# Patient Record
Sex: MALE | Race: WHITE | NOT HISPANIC OR LATINO
[De-identification: names, ages, dates, MRNs, and addresses within clinical notes are randomized per-mention and may not be internally consistent; named-entity substitution may affect disease eponyms.]

---

## 2018-04-11 ENCOUNTER — APPOINTMENT (OUTPATIENT)
Dept: RADIOLOGY | Facility: CLINIC | Age: 58
End: 2018-04-11
Payer: COMMERCIAL

## 2018-04-11 ENCOUNTER — OUTPATIENT (OUTPATIENT)
Dept: OUTPATIENT SERVICES | Facility: HOSPITAL | Age: 58
LOS: 1 days | End: 2018-04-11
Payer: COMMERCIAL

## 2018-04-11 DIAGNOSIS — Z00.8 ENCOUNTER FOR OTHER GENERAL EXAMINATION: ICD-10-CM

## 2018-04-11 PROCEDURE — 74018 RADEX ABDOMEN 1 VIEW: CPT | Mod: 26

## 2018-04-11 PROCEDURE — 74018 RADEX ABDOMEN 1 VIEW: CPT

## 2019-05-10 ENCOUNTER — APPOINTMENT (OUTPATIENT)
Dept: UROLOGY | Facility: CLINIC | Age: 59
End: 2019-05-10
Payer: COMMERCIAL

## 2019-05-31 ENCOUNTER — APPOINTMENT (OUTPATIENT)
Dept: UROLOGY | Facility: CLINIC | Age: 59
End: 2019-05-31
Payer: COMMERCIAL

## 2019-05-31 VITALS
BODY MASS INDEX: 31.14 KG/M2 | WEIGHT: 235 LBS | HEIGHT: 73 IN | OXYGEN SATURATION: 98 % | DIASTOLIC BLOOD PRESSURE: 80 MMHG | SYSTOLIC BLOOD PRESSURE: 154 MMHG | HEART RATE: 91 BPM

## 2019-05-31 DIAGNOSIS — N20.0 CALCULUS OF KIDNEY: ICD-10-CM

## 2019-05-31 PROCEDURE — 99204 OFFICE O/P NEW MOD 45 MIN: CPT

## 2019-05-31 RX ORDER — METOPROLOL SUCCINATE 50 MG/1
50 TABLET, EXTENDED RELEASE ORAL
Refills: 0 | Status: ACTIVE | COMMUNITY

## 2019-05-31 RX ORDER — FINASTERIDE 5 MG/1
5 TABLET, FILM COATED ORAL
Refills: 0 | Status: ACTIVE | COMMUNITY

## 2019-05-31 RX ORDER — ATORVASTATIN CALCIUM 20 MG/1
20 TABLET, FILM COATED ORAL
Refills: 0 | Status: ACTIVE | COMMUNITY

## 2019-05-31 RX ORDER — LEVOTHYROXINE SODIUM 50 UG/1
50 TABLET ORAL
Refills: 0 | Status: ACTIVE | COMMUNITY

## 2019-05-31 RX ORDER — FESOTERODINE FUMARATE 8 MG/1
8 TABLET, FILM COATED, EXTENDED RELEASE ORAL
Refills: 0 | Status: ACTIVE | COMMUNITY

## 2019-05-31 RX ORDER — DORZOLAMIDE HYDROCHLORIDE TIMOLOL MALEATE 20; 5 MG/ML; MG/ML
22.3-6.8 SOLUTION/ DROPS OPHTHALMIC
Refills: 0 | Status: ACTIVE | COMMUNITY

## 2019-05-31 RX ORDER — BIMATOPROST 0.1 MG/ML
0.01 SOLUTION/ DROPS OPHTHALMIC
Refills: 0 | Status: ACTIVE | COMMUNITY

## 2019-05-31 RX ORDER — FLUTICASONE PROPIONATE 50 MCG
50 SPRAY, SUSPENSION NASAL
Refills: 0 | Status: ACTIVE | COMMUNITY

## 2019-05-31 RX ORDER — FEBUXOSTAT 40 MG/1
40 TABLET ORAL
Refills: 0 | Status: ACTIVE | COMMUNITY

## 2019-05-31 NOTE — REVIEW OF SYSTEMS
[Eyesight Problems] : eyesight problems [Heartburn] : heartburn [History of kidney stones] : history of kidney stones [Wake up at night to urinate  How many times?  ___] : wakes up to urinate [unfilled] times during the night [Strong urge to urinate] : strong urge to urinate [Difficulty Walking] : difficulty walking [Negative] : Heme/Lymph

## 2019-06-01 PROBLEM — N20.0 NEPHROLITHIASIS: Status: ACTIVE | Noted: 2019-06-01

## 2019-06-01 NOTE — LETTER BODY
[Dear  ___] : Dear  [unfilled], [Consult Letter:] : I had the pleasure of evaluating your patient, [unfilled]. [Please see my note below.] : Please see my note below. [Consult Closing:] : Thank you very much for allowing me to participate in the care of this patient.  If you have any questions, please do not hesitate to contact me. [Sincerely,] : Sincerely, [FreeTextEntry2] : Dr. Eric Diaz DO, LOR\par [address] [facility] [address]\[place] [address]\par [phone] (Phone)\par [phone] (Fax)\par  [FreeTextEntry3] : Danial Clarke M.D.\par

## 2019-12-26 ENCOUNTER — TRANSCRIPTION ENCOUNTER (OUTPATIENT)
Age: 59
End: 2019-12-26

## 2020-11-03 ENCOUNTER — APPOINTMENT (OUTPATIENT)
Dept: PHYSICAL MEDICINE AND REHAB | Facility: CLINIC | Age: 60
End: 2020-11-03

## 2020-11-04 ENCOUNTER — NON-APPOINTMENT (OUTPATIENT)
Age: 60
End: 2020-11-04

## 2020-11-04 DIAGNOSIS — R32 UNSPECIFIED URINARY INCONTINENCE: ICD-10-CM

## 2020-11-04 RX ORDER — PREGABALIN 50 MG/1
50 CAPSULE ORAL
Refills: 0 | Status: ACTIVE | COMMUNITY

## 2020-11-11 ENCOUNTER — NON-APPOINTMENT (OUTPATIENT)
Age: 60
End: 2020-11-11

## 2020-11-11 DIAGNOSIS — R27.0 ATAXIA, UNSPECIFIED: ICD-10-CM

## 2020-11-11 DIAGNOSIS — G60.3 IDIOPATHIC PROGRESSIVE NEUROPATHY: ICD-10-CM

## 2020-11-11 DIAGNOSIS — R52 PAIN, UNSPECIFIED: ICD-10-CM

## 2020-11-11 DIAGNOSIS — R35.1 NOCTURIA: ICD-10-CM

## 2020-11-11 DIAGNOSIS — R35.0 FREQUENCY OF MICTURITION: ICD-10-CM

## 2020-11-11 DIAGNOSIS — R20.0 ANESTHESIA OF SKIN: ICD-10-CM

## 2020-11-11 DIAGNOSIS — E66.9 OBESITY, UNSPECIFIED: ICD-10-CM

## 2020-11-17 ENCOUNTER — APPOINTMENT (OUTPATIENT)
Dept: PHYSICAL MEDICINE AND REHAB | Facility: CLINIC | Age: 60
End: 2020-11-17
Payer: COMMERCIAL

## 2020-11-17 VITALS
SYSTOLIC BLOOD PRESSURE: 138 MMHG | TEMPERATURE: 95.9 F | OXYGEN SATURATION: 100 % | DIASTOLIC BLOOD PRESSURE: 72 MMHG | HEIGHT: 73 IN | HEART RATE: 79 BPM

## 2020-11-17 DIAGNOSIS — G60.0 HEREDITARY MOTOR AND SENSORY NEUROPATHY: ICD-10-CM

## 2020-11-17 DIAGNOSIS — R53.1 WEAKNESS: ICD-10-CM

## 2020-11-17 DIAGNOSIS — M21.372 FOOT DROP, RIGHT FOOT: ICD-10-CM

## 2020-11-17 DIAGNOSIS — M21.371 FOOT DROP, RIGHT FOOT: ICD-10-CM

## 2020-11-17 DIAGNOSIS — M21.379 FOOT DROP, UNSPECIFIED FOOT: ICD-10-CM

## 2020-11-17 PROCEDURE — 99204 OFFICE O/P NEW MOD 45 MIN: CPT

## 2020-11-17 NOTE — PHYSICAL EXAM
[Normal] : Normal skin color and pigmentation, normal turgor and no rash [de-identified] : AAo x 3, bl UE EF/Sh Abd/EE- 4+/5, bl / finger abd 3+/5; bl HF/KE 5/5, bl ADF/APF- 0/5; sens decreased distal UEs/LEs, DTRs trace throughout.  [de-identified] : anxious

## 2020-11-17 NOTE — ASSESSMENT
[FreeTextEntry1] : - Advised that he would benefit from AFO. \par - Advised to take Lyrica on regular basis 50mg TID or 75 mg TID rather than as needed.\par - Patient resident to going to PT- will do HEP at this time. \par - f/u prn

## 2020-11-17 NOTE — HISTORY OF PRESENT ILLNESS
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 \par Patient is frustrated with worsening weakness in the legs and dyesthetic pain of the hands.\par Has been taking lyrica 25-50 mg BID- takes as needed.\par No B/B incontinence. \par Has not had therapy in 5-6 years.

## 2021-01-13 ENCOUNTER — APPOINTMENT (OUTPATIENT)
Dept: NEUROLOGY | Facility: CLINIC | Age: 61
End: 2021-01-13

## 2021-02-26 ENCOUNTER — APPOINTMENT (OUTPATIENT)
Dept: NEUROLOGY | Facility: CLINIC | Age: 61
End: 2021-02-26
Payer: COMMERCIAL

## 2021-02-26 VITALS
WEIGHT: 242 LBS | DIASTOLIC BLOOD PRESSURE: 68 MMHG | SYSTOLIC BLOOD PRESSURE: 146 MMHG | BODY MASS INDEX: 32.07 KG/M2 | HEART RATE: 92 BPM | HEIGHT: 73 IN

## 2021-02-26 PROCEDURE — 99213 OFFICE O/P EST LOW 20 MIN: CPT

## 2021-02-26 PROCEDURE — 99072 ADDL SUPL MATRL&STAF TM PHE: CPT

## 2021-02-26 RX ORDER — PREGABALIN 25 MG/1
25 CAPSULE ORAL
Refills: 0 | Status: DISCONTINUED | COMMUNITY
End: 2021-02-26

## 2021-02-26 RX ORDER — AMPICILLIN 250 MG/1
250 CAPSULE ORAL
Refills: 0 | Status: DISCONTINUED | COMMUNITY
End: 2021-02-26

## 2021-02-26 RX ORDER — METHYLPREDNISOLONE 4 MG/1
4 TABLET ORAL
Refills: 0 | Status: DISCONTINUED | COMMUNITY
End: 2021-02-26

## 2021-02-26 RX ORDER — PREGABALIN 25 MG/1
25 CAPSULE ORAL 4 TIMES DAILY
Qty: 120 | Refills: 0 | Status: DISCONTINUED | COMMUNITY
Start: 2020-12-07 | End: 2021-02-26

## 2021-02-26 RX ORDER — PREGABALIN 25 MG/1
25 CAPSULE ORAL 4 TIMES DAILY
Qty: 120 | Refills: 4 | Status: DISCONTINUED | COMMUNITY
Start: 2020-12-27 | End: 2021-02-26

## 2021-02-26 NOTE — CONSULT LETTER
[Dear  ___] : Dear  [unfilled], [Consult Letter:] : I had the pleasure of evaluating your patient, [unfilled]. [Please see my note below.] : Please see my note below. [Consult Closing:] : Thank you very much for allowing me to participate in the care of this patient.  If you have any questions, please do not hesitate to contact me. [Sincerely,] : Sincerely, [FreeTextEntry3] : Janes Plaza MD\par

## 2021-02-26 NOTE — REVIEW OF SYSTEMS
[Feeling Poorly] : feeling poorly [Hand Weakness] :  hand weakness [Leg Weakness] : leg weakness [Poor Coordination] : poor coordination [Numbness] : numbness [Tingling] : tingling [Abnormal Sensation] : an abnormal sensation [Difficulty Walking] : difficulty walking

## 2021-02-26 NOTE — ASSESSMENT
[FreeTextEntry1] : Mr. 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  He will continue treatment with pregabalin.  He will seek another pain management consultation.  I will also provide him with prescription for occupational and physical therapy.  He will return the office in 3 months for follow-up.  Telephone contact will be maintained in the meantime.

## 2021-02-26 NOTE — PHYSICAL EXAM
[FreeTextEntry1] : Constitutional:  Patient was well-developed, well-nourished and in no acute distress. \par \par Head:  Normocephalic, atraumatic. Tympanic membranes were clear. \par \par Neck:  Supple with full range of motion. \par \par Cardiovascular:  Cardiac rhythm was regular without murmur. There were no carotid bruits. Peripheral pulses were full and symmetric.  There was bilateral pedal edema.\par \par Respiratory:  Lungs were clear. \par \par Abdomen:  Soft and nontender. \par \par Spine:  Nontender. \par \par Skin:  There were no rashes. \par \par NEUROLOGICAL EXAMINATION:\par \par Mental Status: Patient was alert and oriented. Speech was fluent. There was no dysarthria. \par \par Cranial Nerves: \par \par II: He could finger count bilaterally. Pupils were equal and reactive. Visual fields were full. Funduscopic examination was normal. \par \par III, IV, VI:  Eye movements were full without nystagmus. \par \par V: Facial sensation was intact. \par \par VII: Facial strength was normal. \par \par VIII: Hearing was equal. \par \par IX, X: Palatal movement was normal. Phonation was normal. \par \par XI: Sternocleidomastoids and trapezii were normal. \par \par XII: Tongue was midline and movements normal. There was no lingual atrophy or fasciculations. \par \par Motor Examination: He had profound weakness of the feet and ankles and hands.\par \par Sensory Examination: Vibration and joint position sense were absent in the feet.  There was shading of pinprick in a stocking glove distribution.\par \par Reflexes: DTRs were absent.\par \par Plantar Responses: Plantar responses were flexor. \par \par Coordination/Cerebellar Function: There was no dysmetria on finger to nose or heel to shin testing. \par \par Gait/Stance: He had a bilateral steppage gait.

## 2021-04-13 ENCOUNTER — NON-APPOINTMENT (OUTPATIENT)
Age: 61
End: 2021-04-13

## 2021-05-13 ENCOUNTER — TRANSCRIPTION ENCOUNTER (OUTPATIENT)
Age: 61
End: 2021-05-13

## 2021-08-05 ENCOUNTER — APPOINTMENT (OUTPATIENT)
Dept: NEUROLOGY | Facility: CLINIC | Age: 61
End: 2021-08-05

## 2021-09-14 ENCOUNTER — APPOINTMENT (OUTPATIENT)
Dept: NEUROLOGY | Facility: CLINIC | Age: 61
End: 2021-09-14

## 2021-10-21 ENCOUNTER — RX RENEWAL (OUTPATIENT)
Age: 61
End: 2021-10-21

## 2021-10-21 RX ORDER — PREGABALIN 25 MG/1
25 CAPSULE ORAL 4 TIMES DAILY
Qty: 120 | Refills: 3 | Status: ACTIVE | COMMUNITY
Start: 2021-02-11 | End: 1900-01-01

## 2021-11-16 ENCOUNTER — APPOINTMENT (OUTPATIENT)
Dept: NEUROLOGY | Facility: CLINIC | Age: 61
End: 2021-11-16
Payer: COMMERCIAL

## 2021-11-16 VITALS
HEART RATE: 80 BPM | SYSTOLIC BLOOD PRESSURE: 140 MMHG | BODY MASS INDEX: 30.88 KG/M2 | HEIGHT: 73 IN | DIASTOLIC BLOOD PRESSURE: 70 MMHG | WEIGHT: 233 LBS

## 2021-11-16 PROCEDURE — 99213 OFFICE O/P EST LOW 20 MIN: CPT

## 2021-11-16 NOTE — ASSESSMENT
[FreeTextEntry1] : Mr. 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  He will continue treatment with pregabalin but the dose will be increased from 25 to 50 mg twice a day.  I completed disability forms for him.  He does not wish to pursue occupational or physical therapy.  He will return to the office in April 2022 for follow-up.  Telephone contact will be maintained in the meantime.

## 2021-11-16 NOTE — HISTORY OF PRESENT ILLNESS
[FreeTextEntry1] : Mr. Jose Kirby returned to the office having been last seen on February 26, 2021. He is a 61-year-old right-handed gentleman with X-linked Charcot-Guillermina-Tooth disease.  He complains of worsening pain and weakness in his limbs.  He is ambulating independently.  He complains of urinary frequency and urgency.  He is on finasteride and Toviaz.  He was also started on another medication to shrink his prostate.  He is taking pregabalin 25 mg twice a day which alleviates his discomfort.  He requested that his next prescription be raised to 50 mg twice a day.  He is not undergoing occupational or physical therapy.  He received Pfizer COVID-19 vaccine but did not receive his booster or the influenza vaccine.  He experiences sharp pains in his hands.

## 2021-11-25 NOTE — HISTORY OF PRESENT ILLNESS
[FreeTextEntry1] : Patient is here for evaluation.\par Referred by Dr. Eric Culp.\par He has a history of kidney stones.\par Patient reports that ESWL was attempted, but he could not lay flat to undergo the procedure. He could not have anesthesia administered because he was not able to lay still to be induced. He is here in hopes of having stones removed or fragmented using a bed that would allow him to sit upright. He states that he has significant post nasal drip that precludes him from laying flat. He states that he sees an allergist and is receiving allergy shots. But he still has the post nasal drip. He sleeps upright at his home.\par \par He has yet to see ENT because he believes that "there is nothing they can do" for him.\par \par Xray and CT images and report reviewed ([facility]): right lower pole cluster of stones. Other smaller stones left kidney and right kidney.\par \par Advised patient that he needs to be able to lay flat, or almost flat to administer anesthesia (general) to be able to operate. Once he is under anesthesia, he will not have to think about being flat. Despite a long discussion, patient is sure he will not be able to lay flat even for a few minutes to allow anesthesia to be induced.\par \par I suggest he should still consult with ENT and see his allergist to optimize his treatment.\par Then he can reconsult if he desires to have surgery for his stones. Yes

## 2022-04-06 ENCOUNTER — APPOINTMENT (OUTPATIENT)
Dept: NEUROLOGY | Facility: CLINIC | Age: 62
End: 2022-04-06

## 2022-06-17 ENCOUNTER — RX RENEWAL (OUTPATIENT)
Age: 62
End: 2022-06-17

## 2022-06-17 RX ORDER — PREGABALIN 50 MG/1
50 CAPSULE ORAL
Qty: 90 | Refills: 0 | Status: ACTIVE | COMMUNITY
Start: 2022-06-17 | End: 1900-01-01

## 2022-06-27 RX ORDER — PREGABALIN 50 MG/1
50 CAPSULE ORAL
Qty: 90 | Refills: 3 | Status: ACTIVE | COMMUNITY
Start: 2021-10-21

## 2022-08-17 ENCOUNTER — TRANSCRIPTION ENCOUNTER (OUTPATIENT)
Age: 62
End: 2022-08-17

## 2022-08-17 ENCOUNTER — RX RENEWAL (OUTPATIENT)
Age: 62
End: 2022-08-17

## 2022-08-18 ENCOUNTER — RX RENEWAL (OUTPATIENT)
Age: 62
End: 2022-08-18

## 2022-09-12 ENCOUNTER — APPOINTMENT (OUTPATIENT)
Dept: NEUROLOGY | Facility: CLINIC | Age: 62
End: 2022-09-12

## 2022-09-13 ENCOUNTER — APPOINTMENT (OUTPATIENT)
Dept: NEUROLOGY | Facility: CLINIC | Age: 62
End: 2022-09-13

## 2022-09-13 VITALS
DIASTOLIC BLOOD PRESSURE: 75 MMHG | HEART RATE: 85 BPM | SYSTOLIC BLOOD PRESSURE: 138 MMHG | BODY MASS INDEX: 31.14 KG/M2 | HEIGHT: 73 IN | WEIGHT: 235 LBS

## 2022-09-13 PROCEDURE — 99214 OFFICE O/P EST MOD 30 MIN: CPT

## 2022-09-13 NOTE — ASSESSMENT
[FreeTextEntry1] : Mr. 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  He does not wish to pursue physical therapy or a swallowing evaluation.  He may continue pregabalin 50 mg as needed.  He will return to the office in 6 months for routine follow-up.  I suggested that he take all precautions to avoid falling.

## 2022-09-13 NOTE — HISTORY OF PRESENT ILLNESS
[FreeTextEntry1] : Mr. Jose Kirby returned to the office having been last seen on November 16, 2021. He is a 62-year-old right-handed gentleman with X-linked Charcot-Guillermina-Tooth disease.  \par \par He complains of worsening weakness and numbness in his legs.  He falls frequently but does use a walking aid.  He notes some difficulty swallowing.  He complains of tremulousness.\par \par Medications include atorvastatin, finasteride, Flonase, levothyroxine, Lyrica 50 mg usually once daily, metoprolol, Nexium, Prolia, Uloric and metformin.\par \par \par \par \par \par  DISCHARGE

## 2022-09-27 ENCOUNTER — RX RENEWAL (OUTPATIENT)
Age: 62
End: 2022-09-27

## 2023-05-17 ENCOUNTER — APPOINTMENT (OUTPATIENT)
Dept: NEUROLOGY | Facility: CLINIC | Age: 63
End: 2023-05-17

## 2023-10-04 ENCOUNTER — APPOINTMENT (OUTPATIENT)
Dept: NEUROLOGY | Facility: CLINIC | Age: 63
End: 2023-10-04
Payer: MEDICARE

## 2023-10-04 VITALS
BODY MASS INDEX: 29.82 KG/M2 | HEART RATE: 80 BPM | HEIGHT: 73 IN | SYSTOLIC BLOOD PRESSURE: 111 MMHG | DIASTOLIC BLOOD PRESSURE: 65 MMHG | WEIGHT: 225 LBS

## 2023-10-04 DIAGNOSIS — G60.9 HEREDITARY AND IDIOPATHIC NEUROPATHY, UNSPECIFIED: ICD-10-CM

## 2023-10-04 PROCEDURE — 99213 OFFICE O/P EST LOW 20 MIN: CPT

## 2023-12-28 ENCOUNTER — RX RENEWAL (OUTPATIENT)
Age: 63
End: 2023-12-28

## 2023-12-28 RX ORDER — PREGABALIN 50 MG/1
50 CAPSULE ORAL
Qty: 90 | Refills: 3 | Status: ACTIVE | COMMUNITY
Start: 2022-08-18 | End: 1900-01-01

## 2024-02-16 ENCOUNTER — APPOINTMENT (OUTPATIENT)
Dept: NEUROLOGY | Facility: CLINIC | Age: 64
End: 2024-02-16

## 2025-04-14 ENCOUNTER — APPOINTMENT (OUTPATIENT)
Dept: ENDOCRINOLOGY | Facility: CLINIC | Age: 65
End: 2025-04-14
Payer: MEDICARE

## 2025-04-14 VITALS
DIASTOLIC BLOOD PRESSURE: 67 MMHG | SYSTOLIC BLOOD PRESSURE: 149 MMHG | WEIGHT: 215 LBS | HEART RATE: 91 BPM | HEIGHT: 73 IN | BODY MASS INDEX: 28.49 KG/M2 | OXYGEN SATURATION: 97 %

## 2025-04-14 DIAGNOSIS — E03.9 HYPOTHYROIDISM, UNSPECIFIED: ICD-10-CM

## 2025-04-14 PROCEDURE — 99203 OFFICE O/P NEW LOW 30 MIN: CPT

## 2025-05-16 ENCOUNTER — APPOINTMENT (OUTPATIENT)
Dept: NEUROLOGY | Facility: CLINIC | Age: 65
End: 2025-05-16

## 2025-07-08 ENCOUNTER — APPOINTMENT (OUTPATIENT)
Dept: ENDOCRINOLOGY | Facility: CLINIC | Age: 65
End: 2025-07-08